# Patient Record
Sex: MALE | Race: WHITE | NOT HISPANIC OR LATINO | URBAN - NONMETROPOLITAN AREA
[De-identification: names, ages, dates, MRNs, and addresses within clinical notes are randomized per-mention and may not be internally consistent; named-entity substitution may affect disease eponyms.]

---

## 2019-06-10 ENCOUNTER — IMPORTED ENCOUNTER (OUTPATIENT)
Dept: URBAN - NONMETROPOLITAN AREA CLINIC 1 | Facility: CLINIC | Age: 74
End: 2019-06-10

## 2019-06-10 PROBLEM — Z96.1: Noted: 2017-05-15

## 2019-06-10 PROBLEM — H52.4: Noted: 2019-06-10

## 2019-06-10 PROBLEM — H16.223: Noted: 2019-06-10

## 2019-06-10 PROCEDURE — 92012 INTRM OPH EXAM EST PATIENT: CPT

## 2019-06-10 NOTE — PATIENT DISCUSSION
PVD OU hx vitreous heme OS-  Discussed findings of exam in detail with the patient.-  No holes tears or detachments noted today. Flat 360. -  The risk of retinal detachment in patients with PVDs was discussed with the patient and the warning signs of retinal detachment were carefully reviewed with the patient. -  The patient was warned to return to the office or contact the ophthalmologist on call immediately if they experience signs of retinal detachment or changes in vision noted from today. -  Continue to monitor. 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 - Stable and doing well. - Happy with OTC readers currently- RTC for yearly visit. ADEEL OU- Discussed diagnosis in detail with patient- Discussed signs and symptoms of progression- Recommend patient drinking plenty of water and starting Omega 3’s - Recommend Refresh or Systane  throughout the day- Samples of Systane Complete given today- Consider Restasis or plugs in the future if no improvement- Continue to monitor; 's Notes: MR MUELLER NEREIDA  6/11/2018[employer]  7/3/15

## 2020-06-17 ENCOUNTER — IMPORTED ENCOUNTER (OUTPATIENT)
Dept: URBAN - NONMETROPOLITAN AREA CLINIC 1 | Facility: CLINIC | Age: 75
End: 2020-06-17

## 2020-06-17 PROCEDURE — 92012 INTRM OPH EXAM EST PATIENT: CPT

## 2020-06-17 NOTE — PATIENT DISCUSSION
PVD OU hx vitreous heme OS-  Discussed findings of exam in detail with the patient.-  No holes tears or detachments noted today. Flat 360. -  The risk of retinal detachment in patients with PVDs was discussed with the patient and the warning signs of retinal detachment were carefully reviewed with the patient. -  The patient was warned to return to the office or contact the ophthalmologist on call immediately if they experience signs of retinal detachment or changes in vision noted from today. -  Continue to monitor.  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 - Happy with OTC readers currently- Continue to monitor ADEEL OU- Discussed diagnosis in detail with patient- Discussed signs and symptoms of progression- Recommend patient drinking plenty of water and starting Omega 3’s - Recommend Refresh or Systane  throughout the day- Samples of Refresh Relieva given today- Consider Restasis or plugs in the future if no improvement- Continue to monitor; 's Notes: MR MUELLER DFSHERRI  6/11/2018[employer]  7/3/15

## 2021-06-21 ENCOUNTER — IMPORTED ENCOUNTER (OUTPATIENT)
Dept: URBAN - NONMETROPOLITAN AREA CLINIC 1 | Facility: CLINIC | Age: 76
End: 2021-06-21

## 2021-06-21 PROCEDURE — 99213 OFFICE O/P EST LOW 20 MIN: CPT

## 2021-06-21 NOTE — PATIENT DISCUSSION
PVD OU hx vitreous heme OS- Discussed findings of exam in detail with the patient.- No holes tears or detachments noted today. Flat 360. - The risk of retinal detachment in patients with PVDs was discussed with the patient and the warning signs of retinal detachment were carefully reviewed with the patient. - The patient was warned to return to the office or contact the ophthalmologist on call immediately if they experience signs of retinal detachment or changes in vision noted from today. - Continue to monitor.  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 - Happy with OTC readers currently- Continue to monitor ADEEL OU- Discussed diagnosis in detail with patient- Discussed signs and symptoms of progression- Recommend patient drinking plenty of water and starting Omega 3’s - Recommend Refresh or Systane  throughout the day- Samples of Refresh Relieva given today- Continue to monitor; 's Notes: MR MUELLER NEREIDA  6/11/2018[employer]  7/3/15

## 2022-04-15 ASSESSMENT — VISUAL ACUITY
OS_CC: 20/20
OD_GLARE: 20/20
OD_GLARE: 20/40
OD_SC: 20/20
OS_SC: 20/20-
OS_CC: 20/30-
OS_GLARE: 20/40
OD_CC: 20/30-
OU_CC: 20/25
OS_GLARE: 20/20
OD_CC: 20/20-

## 2022-04-15 ASSESSMENT — TONOMETRY
OS_IOP_MMHG: 13
OS_IOP_MMHG: 13
OD_IOP_MMHG: 12
OD_IOP_MMHG: 12
OD_IOP_MMHG: 14
OS_IOP_MMHG: 12

## 2022-06-28 ENCOUNTER — COMPREHENSIVE EXAM (OUTPATIENT)
Dept: URBAN - NONMETROPOLITAN AREA CLINIC 1 | Facility: CLINIC | Age: 77
End: 2022-06-28

## 2022-06-28 DIAGNOSIS — H52.4: ICD-10-CM

## 2022-06-28 DIAGNOSIS — H01.024: ICD-10-CM

## 2022-06-28 DIAGNOSIS — Z96.1: ICD-10-CM

## 2022-06-28 DIAGNOSIS — H01.021: ICD-10-CM

## 2022-06-28 PROCEDURE — 99213 OFFICE O/P EST LOW 20 MIN: CPT

## 2022-06-28 PROCEDURE — 92015 DETERMINE REFRACTIVE STATE: CPT

## 2022-06-28 ASSESSMENT — TONOMETRY
OS_IOP_MMHG: 12
OD_IOP_MMHG: 12

## 2022-06-28 ASSESSMENT — VISUAL ACUITY
OD_SC: 20/20-2
OS_SC: 20/20

## 2023-06-29 ENCOUNTER — FOLLOW UP (OUTPATIENT)
Dept: URBAN - NONMETROPOLITAN AREA CLINIC 1 | Facility: CLINIC | Age: 78
End: 2023-06-29

## 2023-06-29 DIAGNOSIS — H01.02A: ICD-10-CM

## 2023-06-29 DIAGNOSIS — H16.223: ICD-10-CM

## 2023-06-29 DIAGNOSIS — Z96.1: ICD-10-CM

## 2023-06-29 DIAGNOSIS — H43.813: ICD-10-CM

## 2023-06-29 DIAGNOSIS — H52.4: ICD-10-CM

## 2023-06-29 DIAGNOSIS — H01.02B: ICD-10-CM

## 2023-06-29 PROCEDURE — 99214 OFFICE O/P EST MOD 30 MIN: CPT

## 2023-06-29 ASSESSMENT — TONOMETRY
OD_IOP_MMHG: 14
OS_IOP_MMHG: 14

## 2023-06-29 ASSESSMENT — VISUAL ACUITY
OS_CC: 20/30+2
OU_CC: 20/25+2
OD_CC: 20/30+2

## 2024-07-02 ENCOUNTER — FOLLOW UP (OUTPATIENT)
Dept: URBAN - NONMETROPOLITAN AREA CLINIC 1 | Facility: CLINIC | Age: 79
End: 2024-07-02

## 2024-07-02 DIAGNOSIS — H16.223: ICD-10-CM

## 2024-07-02 DIAGNOSIS — H01.02A: ICD-10-CM

## 2024-07-02 DIAGNOSIS — H01.02B: ICD-10-CM

## 2024-07-02 DIAGNOSIS — Z96.1: ICD-10-CM

## 2024-07-02 DIAGNOSIS — H43.813: ICD-10-CM

## 2024-07-02 PROCEDURE — 99212 OFFICE O/P EST SF 10 MIN: CPT

## 2024-07-02 PROCEDURE — 92250 FUNDUS PHOTOGRAPHY W/I&R: CPT

## 2024-07-02 RX ORDER — BRIMONIDINE TARTRATE 0.25 MG/ML: 1 SOLUTION/ DROPS OPHTHALMIC

## 2024-07-02 ASSESSMENT — TONOMETRY
OD_IOP_MMHG: 15
OS_IOP_MMHG: 14

## 2024-07-02 ASSESSMENT — VISUAL ACUITY
OD_CC: 20/20-1
OS_CC: 20/20-1
OU_CC: 20/20

## 2025-07-07 ENCOUNTER — FOLLOW UP (OUTPATIENT)
Age: 80
End: 2025-07-07

## 2025-07-07 DIAGNOSIS — H16.223: ICD-10-CM

## 2025-07-07 DIAGNOSIS — H52.4: ICD-10-CM

## 2025-07-07 DIAGNOSIS — H43.813: ICD-10-CM

## 2025-07-07 PROCEDURE — 92014 COMPRE OPH EXAM EST PT 1/>: CPT

## 2025-07-07 PROCEDURE — 92015 DETERMINE REFRACTIVE STATE: CPT

## 2025-07-07 PROCEDURE — 92250 FUNDUS PHOTOGRAPHY W/I&R: CPT
